# Patient Record
Sex: FEMALE | Race: WHITE | NOT HISPANIC OR LATINO | Employment: OTHER | URBAN - METROPOLITAN AREA
[De-identification: names, ages, dates, MRNs, and addresses within clinical notes are randomized per-mention and may not be internally consistent; named-entity substitution may affect disease eponyms.]

---

## 2018-06-26 ENCOUNTER — OFFICE VISIT (OUTPATIENT)
Dept: OBSTETRICS AND GYNECOLOGY | Age: 68
End: 2018-06-26

## 2018-06-26 ENCOUNTER — APPOINTMENT (OUTPATIENT)
Dept: WOMENS IMAGING | Facility: HOSPITAL | Age: 68
End: 2018-06-26

## 2018-06-26 VITALS
SYSTOLIC BLOOD PRESSURE: 112 MMHG | WEIGHT: 199 LBS | DIASTOLIC BLOOD PRESSURE: 62 MMHG | HEIGHT: 66 IN | BODY MASS INDEX: 31.98 KG/M2

## 2018-06-26 DIAGNOSIS — I10 ESSENTIAL HYPERTENSION: ICD-10-CM

## 2018-06-26 DIAGNOSIS — Z00.00 ANNUAL PHYSICAL EXAM: ICD-10-CM

## 2018-06-26 DIAGNOSIS — E03.9 HYPOTHYROIDISM, UNSPECIFIED TYPE: ICD-10-CM

## 2018-06-26 DIAGNOSIS — Z11.51 SCREENING FOR HPV (HUMAN PAPILLOMAVIRUS): ICD-10-CM

## 2018-06-26 DIAGNOSIS — Z12.4 SCREENING FOR CERVICAL CANCER: ICD-10-CM

## 2018-06-26 DIAGNOSIS — Z01.419 ENCOUNTER FOR WELL WOMAN EXAM WITH ROUTINE GYNECOLOGICAL EXAM: Primary | ICD-10-CM

## 2018-06-26 PROCEDURE — G0101 CA SCREEN;PELVIC/BREAST EXAM: HCPCS | Performed by: OBSTETRICS & GYNECOLOGY

## 2018-06-26 PROCEDURE — 77067 SCR MAMMO BI INCL CAD: CPT | Performed by: RADIOLOGY

## 2018-06-26 RX ORDER — ATENOLOL 25 MG/1
25 TABLET ORAL
COMMUNITY
Start: 2017-11-22

## 2018-06-26 RX ORDER — LEVOTHYROXINE SODIUM 137 UG/1
TABLET ORAL
COMMUNITY
Start: 2018-06-18

## 2018-06-26 RX ORDER — ESCITALOPRAM OXALATE 20 MG/1
20 TABLET ORAL
COMMUNITY

## 2018-06-26 RX ORDER — BUPROPION HYDROCHLORIDE 300 MG/1
300 TABLET ORAL
COMMUNITY

## 2018-06-26 RX ORDER — OLANZAPINE 2.5 MG/1
2.5 TABLET ORAL
COMMUNITY

## 2018-06-26 NOTE — PROGRESS NOTES
Subjective   Lis Ospina is a 68 y.o. female is being seen today for   Chief Complaint   Patient presents with   • Annual Exam     PT HERE FOR ROUTINE AE AND MG. SHE IS WELL. LAST PAP 2012 NEG. LAST DEXA 2016 LAST C-SCOPE 2016.   .    History of Present Illness  Patient is here for an annual exam.  A lot of things going on right now.  Her  was just diagnosed with a lymphoma month ago he is undergoing chemotherapy.  Her parents and 95 and 89 she just moved them down to a [facility] home here today or in [place].  She herself is doing well no particular complaints today.  Medicines are the same and a blood pressure thyroid again no change.  Her bowels bladder work well there is no other family history.  The following portions of the patient's history were reviewed and updated as appropriate: allergies, current medications, past family history, past medical history, past social history, past surgical history and problem list.    Vitals:    06/26/18 1009   BP: 112/62       PAST MEDICAL HISTORY  No past medical history on file.  OB History     No data available        No past surgical history on file.  No family history on file.  History   Smoking Status   • Not on file   Smokeless Tobacco   • Not on file       Current Outpatient Prescriptions:   •  atenolol (TENORMIN) 25 MG tablet, Take 25 mg by mouth., Disp: , Rfl:   •  levothyroxine (SYNTHROID, LEVOTHROID) 137 MCG tablet, TAKE 1 TABLET EVERY DAY, Disp: , Rfl:   •  buPROPion XL (WELLBUTRIN XL) 300 MG 24 hr tablet, Take 300 mg by mouth., Disp: , Rfl:   •  escitalopram (LEXAPRO) 20 MG tablet, Take 20 mg by mouth., Disp: , Rfl:   •  OLANZapine (zyPREXA) 2.5 MG tablet, Take 2.5 mg by mouth., Disp: , Rfl:     There is no immunization history on file for this patient.    Review of Systems   Psychiatric/Behavioral: Positive for sleep disturbance.       Objective   Physical Exam   Constitutional: She is oriented to person, place, and time. Vital signs are normal. She  appears well-developed and well-nourished.   Neck: No thyromegaly present.   Cardiovascular: Normal rate, regular rhythm and normal heart sounds.    Pulmonary/Chest: Effort normal. Right breast exhibits no inverted nipple, no mass, no nipple discharge, no skin change and no tenderness. Left breast exhibits no inverted nipple, no mass, no nipple discharge, no skin change and no tenderness. Breasts are symmetrical. There is no breast swelling.   Abdominal: Soft.   Genitourinary: Vagina normal and uterus normal. No breast tenderness, discharge or bleeding. Pelvic exam was performed with patient supine. No labial fusion. There is no rash, tenderness, lesion or injury on the right labia. There is no rash, tenderness, lesion or injury on the left labia. Cervix exhibits no motion tenderness, no discharge and no friability. Right adnexum displays no mass, no tenderness and no fullness. Left adnexum displays no mass, no tenderness and no fullness.   Neurological: She is alert and oriented to person, place, and time.   Psychiatric: She has a normal mood and affect.   Vitals reviewed.        Assessment/Plan   Lis was seen today for annual exam.    Diagnoses and all orders for this visit:    Encounter for well woman exam with routine gynecological exam  -     IGP, Aptima HPV, Rfx 16 / 18,45    Screening for HPV (human papillomavirus)  -     IGP, Aptima HPV, Rfx 16 / 18,45    Screening for cervical cancer  -     IGP, Aptima HPV, Rfx 16 / 18,45    Annual physical exam    Hypothyroidism, unspecified type    Essential hypertension        Today's exam was normal.  I did a Pap smear today.  Mammogram was done today.  Colonoscopy was in 16 skin for 10 years and a bone density was also done and 16 and she's good for another couple of years.  Talked about exercise come back in a year

## 2018-06-28 LAB
CYTOLOGIST CVX/VAG CYTO: NORMAL
CYTOLOGY CVX/VAG DOC THIN PREP: NORMAL
DX ICD CODE: NORMAL
HIV 1 & 2 AB SER-IMP: NORMAL
HPV I/H RISK 4 DNA CVX QL PROBE+SIG AMP: NEGATIVE
OTHER STN SPEC: NORMAL
PATH REPORT.FINAL DX SPEC: NORMAL
STAT OF ADQ CVX/VAG CYTO-IMP: NORMAL

## 2018-09-13 ENCOUNTER — TRANSCRIBE ORDERS (OUTPATIENT)
Dept: ADMINISTRATIVE | Facility: HOSPITAL | Age: 68
End: 2018-09-13

## 2018-09-13 ENCOUNTER — HOSPITAL ENCOUNTER (OUTPATIENT)
Dept: GENERAL RADIOLOGY | Facility: HOSPITAL | Age: 68
Discharge: HOME | End: 2018-09-13
Admitting: NURSE PRACTITIONER

## 2018-09-13 DIAGNOSIS — R05.9 COUGH: ICD-10-CM

## 2018-09-13 DIAGNOSIS — R79.89 OTHER SPECIFIED ABNORMAL FINDINGS OF BLOOD CHEMISTRY: ICD-10-CM

## 2018-09-13 DIAGNOSIS — N28.9 DISORDER OF KIDNEY AND URETER: Primary | ICD-10-CM

## 2018-09-13 DIAGNOSIS — N28.9 DISORDER OF KIDNEY AND URETER: ICD-10-CM

## 2018-09-13 DIAGNOSIS — R76.0 RAISED ANTIBODY TITER: ICD-10-CM

## 2018-09-13 PROCEDURE — 71046 X-RAY EXAM CHEST 2 VIEWS: CPT

## 2020-10-16 ENCOUNTER — APPOINTMENT (OUTPATIENT)
Dept: WOMENS IMAGING | Facility: HOSPITAL | Age: 70
End: 2020-10-16

## 2020-10-16 PROCEDURE — 77067 SCR MAMMO BI INCL CAD: CPT | Performed by: RADIOLOGY

## 2020-10-16 PROCEDURE — 77063 BREAST TOMOSYNTHESIS BI: CPT | Performed by: RADIOLOGY

## 2021-12-16 ENCOUNTER — APPOINTMENT (OUTPATIENT)
Dept: WOMENS IMAGING | Facility: HOSPITAL | Age: 71
End: 2021-12-16

## 2021-12-16 PROCEDURE — 77063 BREAST TOMOSYNTHESIS BI: CPT | Performed by: RADIOLOGY

## 2021-12-16 PROCEDURE — 77067 SCR MAMMO BI INCL CAD: CPT | Performed by: RADIOLOGY

## 2023-01-17 ENCOUNTER — APPOINTMENT (OUTPATIENT)
Dept: WOMENS IMAGING | Facility: HOSPITAL | Age: 73
End: 2023-01-17
Payer: MEDICARE

## 2023-01-17 PROCEDURE — 77065 DX MAMMO INCL CAD UNI: CPT | Performed by: RADIOLOGY

## 2023-01-17 PROCEDURE — G0279 TOMOSYNTHESIS, MAMMO: HCPCS | Performed by: RADIOLOGY

## 2023-01-17 PROCEDURE — 76642 ULTRASOUND BREAST LIMITED: CPT | Performed by: RADIOLOGY

## 2023-07-17 ENCOUNTER — APPOINTMENT (OUTPATIENT)
Dept: WOMENS IMAGING | Facility: HOSPITAL | Age: 73
End: 2023-07-17
Payer: MEDICARE

## 2023-07-17 PROCEDURE — 77065 DX MAMMO INCL CAD UNI: CPT | Performed by: RADIOLOGY

## 2023-07-17 PROCEDURE — G0279 TOMOSYNTHESIS, MAMMO: HCPCS | Performed by: RADIOLOGY

## 2023-07-17 PROCEDURE — 76642 ULTRASOUND BREAST LIMITED: CPT | Performed by: RADIOLOGY

## 2024-01-17 ENCOUNTER — APPOINTMENT (OUTPATIENT)
Dept: WOMENS IMAGING | Facility: HOSPITAL | Age: 74
End: 2024-01-17
Payer: MEDICARE

## 2024-01-17 PROCEDURE — G0279 TOMOSYNTHESIS, MAMMO: HCPCS | Performed by: RADIOLOGY

## 2024-01-17 PROCEDURE — 77066 DX MAMMO INCL CAD BI: CPT | Performed by: RADIOLOGY

## 2024-01-17 PROCEDURE — 76642 ULTRASOUND BREAST LIMITED: CPT | Performed by: RADIOLOGY

## 2024-01-19 ENCOUNTER — TELEPHONE (OUTPATIENT)
Dept: SURGERY | Facility: CLINIC | Age: 74
End: 2024-01-19
Payer: MEDICARE

## 2024-01-19 NOTE — TELEPHONE ENCOUNTER
New patient chart prepared for dr Pao Vaca to review, new patient referral for second opinion to breast cysts

## 2024-01-22 ENCOUNTER — TELEPHONE (OUTPATIENT)
Dept: SURGERY | Facility: CLINIC | Age: 74
End: 2024-01-22
Payer: MEDICARE

## 2024-01-22 NOTE — TELEPHONE ENCOUNTER
Spoke to pt and got her scheduled for a new pt appt with marcelino chandler for piedad breast cyst for a second opinion on 01/29 @10am    Pt stated understanding   Verified address  Pt aware to come 30 min early to fill out new pt paperwork

## 2024-01-22 NOTE — TELEPHONE ENCOUNTER
Lvm for pt to call back to [facility] new pt appt with marcelino chandler for piedad breast cyst pt wants second opinion

## 2024-01-26 NOTE — PROGRESS NOTES
[facility]     Referring Provider: Volodymyr Schmidt MD     Chief complaint:  piedad breast cyst     HPI: Ms. Lis Ospina is a 74 yo woman, seen at the request of Volodymyr Schmidt MD, for piedad breast cyst    I personally reviewed her records and summarized her relevant breast history/imagin2023 bilateral screening mammogram at [facility]  There are scattered areas of fibroglandular density.  There is an isodense focal asymmetry seen in the anterior one third upper outer region of the left breast  In the right breast no suspicious masses significant calcifications or other abnormalities are seen.  Impression  BI-RADS 0    2023 left diagnostic mammogram and left limited breast ultrasound at [facility]  Scattered fibroglandular density.  The previously described focal asymmetry seen in the anterior one third upper outer region of the left breast is less conspicuous on additional diagnostic images and has a mammographic appearance similar to the prior studies dating back to 2018.  Otherwise no suspicious masses suspicious microcalcifications or architectural distortions are identified.  Ultrasound demonstrates a mass measuring 4 mm in the left breast at 1:00 located 4 cm from the nipple.  This may represent a complicated cyst.  At 130, 2 cm from the nipple, there is a 7 mm complicated cyst.  1 of these 2 may correspond with the mammographic finding.  Impression  Mass in left breast probably benign.  Follow-up mammogram and ultrasound 6 months recommended.  BI-RADS 3    2023 left diagnostic mammogram and left limited breast ultrasound at [facility]  There are scattered areas of fibroglandular density.  Follow-up examination was performed for the focal asymmetry in the left breast 1:00 seen on 2023.  On the present examination there are 2 stable small focal asymmetries in the middle one third of the left breast at 1:00 upper outer located 4 cm from the nipple.  Ultrasound demonstrates 2 stable oval  clusters of cyst and complicated cyst with well-defined thin margins measuring 4 mm and 7 mm in the middle one third of the left breast at 1:00 upper outer located 4 cm from the nipple.  No internal vascularity identified by Doppler.  There is been no significant change from prior exam.  Impression  Stable clusters of cyst and complicated cyst in left breast are probably benign.  Follow-up ultrasound of the left breast in 6 months recommended.  6-month follow-up diagnostic mammogram is recommended.  This will also be time for the bilateral yearly examination.  BI-RADS 3    1/17/2024 bilateral diagnostic mammogram and left limited breast ultrasound at [facility]  Follow-up examination was performed for the focal asymmetries in the left breast 1:00 seen on 7/17/2023.  On the present examination there are 2 small focal asymmetries in the middle one third of the left breast at 1:00 upper outer located 4 cm from the nipple.  There are no significant change from prior exam.  In the right breast no suspicious masses significant calcifications or other abnormalities are seen.  Ultrasound demonstrates 2 stable oval hypoechoic avascular clusters of cyst and complicated cyst with well-defined thin margins measuring 4 mm and 7 mm in the middle one third of the left breast at 1:00 upper outer located 4 cm from nipple.  Impression  Cluster of cyst and complicated cyst in the left breast are probably benign.  A follow-up mammogram 1 year is recommended.  This will also be time for the patient's bilateral yearly mammogram.  A limited breast ultrasound in 1 year is recommended.  BI-RADS 3        She has a family history of breast cancer in her maternal first cousin dx age mid 30s.  She has a family history of ovarian cancer in maternal aunt dx age 70s.    Today she presents with concerns regarding abnormal mammograms and continually having to get mammograms.  She understands that the area of concern is a complicated cyst but wondering if  anything else should be done other than waiting for more imaging  She denies any breast lumps, pain, skin changes, or nipple discharge.         Review of Systems - Oncology    Medications:    Current Outpatient Medications:     amitriptyline (ELAVIL) 25 MG tablet, Take 1 tablet by mouth Daily., Disp: , Rfl:     aspirin 81 MG chewable tablet, Chew 1 tablet Daily., Disp: , Rfl:     atenolol (TENORMIN) 25 MG tablet, Take 1 tablet by mouth Daily., Disp: , Rfl:     atorvastatin (LIPITOR) 10 MG tablet, , Disp: , Rfl:     buPROPion XL (WELLBUTRIN XL) 300 MG 24 hr tablet, Take 1 tablet by mouth., Disp: , Rfl:     desvenlafaxine (PRISTIQ) 100 MG 24 hr tablet, Take 1 tablet by mouth Daily., Disp: , Rfl:     Glucosamine HCl 1000 MG tablet, Take  by mouth., Disp: , Rfl:     levothyroxine (SYNTHROID, LEVOTHROID) 137 MCG tablet, TAKE 1 TABLET EVERY DAY, Disp: , Rfl:     OLANZapine (zyPREXA) 2.5 MG tablet, Take 1 tablet by mouth., Disp: , Rfl:     Allergies:  Allergies   Allergen Reactions    Quetiapine Other (See Comments)     Joint aches    Aripiprazole Anxiety       Medical history:  No past medical history on file.    Surgical History:  Past Surgical History:   Procedure Laterality Date    APPENDECTOMY  1972    EYE SURGERY Bilateral 2001    cateract surgery    GALLBLADDER SURGERY  1996       Family History:  No family history on file.    Social History:   Social History     Socioeconomic History    Marital status:    Tobacco Use    Smoking status: Never     Passive exposure: Never    Smokeless tobacco: Never     Patient drinks 2 servings of caffeine per day.       GYNECOLOGIC HISTORY:   G: 3. P: 3. AB: 0.  Last menstrual period: 52  Age at menarche: 16  Age at first childbirth: 26  Lactation/How long: N/A  Age at menopause: 52  Total years of oral contraceptive use: 25 years  Total years of hormone replacement therapy: 4 years at [facility] 49      Physical Exam  Vitals:    01/29/24 0943   Pulse: 96   SpO2: 97%     ECOG 0 -  Asymptomatic  General: NAD, well appearing  Psych: a&o x 3, normal mood and affect  Eyes: EOMI, no scleral icterus  ENMT: neck supple without masses or thyromegaly, mucus membranes moist  Resp: normal effort, CTAB  CV: RRR, no murmurs, no edema   GI: soft, NT, ND  MSK: normal gait, normal ROM in bilateral shoulders  Lymph nodes: no cervical, supraclavicular or axillary lymphadenopathy  Breast: symmetric, medium  Right: No visible abnormalities on inspection while seated, with arms raised or hands on hips. No masses, skin changes, or nipple abnormalities.  Left: No visible abnormalities on inspection while seated, with arms raised or hands on hips. No masses, skin changes, or nipple abnormalities.      Assessment:    Abnormal breast imaging     Discussion:  I explained the concept of a BI-RADS 3 designation and the process of imaging surveillance. We discussed that a BI-RADS 3 designation describes an imaging finding that is 98-99% likely to represent a benign process. We discussed that the most common management of a BI-RADS 3 finding is initial 6 month imaging surveillance and that the entire period of imaging surveillance can often take 2 years.       Plan:  Exam in 6 months  Bilateral diagnostic mammogram and left limited ultrasound in January 2025  Monthly self breast exams  Return to the office if any new breast concerns      Vanesa Hernandez MA    I have spent 40 mins in face to face time with the patient and in chart review.    CC:  MD Hai Sewell Svarit, MD    EMR Dragon/transcription disclaimer:  Dictated using Dragon dictation

## 2024-01-29 ENCOUNTER — OFFICE VISIT (OUTPATIENT)
Dept: SURGERY | Facility: CLINIC | Age: 74
End: 2024-01-29
Payer: MEDICARE

## 2024-01-29 VITALS
BODY MASS INDEX: 31.34 KG/M2 | DIASTOLIC BLOOD PRESSURE: 74 MMHG | WEIGHT: 195 LBS | HEIGHT: 66 IN | HEART RATE: 96 BPM | SYSTOLIC BLOOD PRESSURE: 118 MMHG | OXYGEN SATURATION: 97 %

## 2024-01-29 DIAGNOSIS — R92.8 ABNORMAL FINDING ON BREAST IMAGING: Primary | ICD-10-CM

## 2024-01-29 PROCEDURE — 3078F DIAST BP <80 MM HG: CPT | Performed by: NURSE PRACTITIONER

## 2024-01-29 PROCEDURE — 1160F RVW MEDS BY RX/DR IN RCRD: CPT | Performed by: NURSE PRACTITIONER

## 2024-01-29 PROCEDURE — 3074F SYST BP LT 130 MM HG: CPT | Performed by: NURSE PRACTITIONER

## 2024-01-29 PROCEDURE — 1159F MED LIST DOCD IN RCRD: CPT | Performed by: NURSE PRACTITIONER

## 2024-01-29 PROCEDURE — 99204 OFFICE O/P NEW MOD 45 MIN: CPT | Performed by: NURSE PRACTITIONER

## 2024-01-29 RX ORDER — ATORVASTATIN CALCIUM 10 MG/1
TABLET, FILM COATED ORAL
COMMUNITY
Start: 2024-01-08

## 2024-01-29 RX ORDER — DESVENLAFAXINE 100 MG/1
100 TABLET, EXTENDED RELEASE ORAL DAILY
COMMUNITY

## 2024-01-29 RX ORDER — AMITRIPTYLINE HYDROCHLORIDE 25 MG/1
1 TABLET, FILM COATED ORAL DAILY
COMMUNITY

## 2024-01-29 RX ORDER — ASPIRIN 81 MG/1
1 TABLET, CHEWABLE ORAL DAILY
COMMUNITY

## 2024-01-29 RX ORDER — ATENOLOL 25 MG/1
25 TABLET ORAL DAILY
COMMUNITY

## 2024-07-26 NOTE — PROGRESS NOTES
[facility]     Referring Provider: Volodymyr Schmidt MD     Chief complaint:  piedad breast cyst     HPI: Ms. Lis Ospina is a 72 yo woman, seen at the request of Volodymyr Schmidt MD, for piedad breast cyst    I personally reviewed her records and summarized her relevant breast history/imagin2023 bilateral screening mammogram at [facility]  There are scattered areas of fibroglandular density.  There is an isodense focal asymmetry seen in the anterior one third upper outer region of the left breast  In the right breast no suspicious masses significant calcifications or other abnormalities are seen.  Impression  BI-RADS 0    2023 left diagnostic mammogram and left limited breast ultrasound at [facility]  Scattered fibroglandular density.  The previously described focal asymmetry seen in the anterior one third upper outer region of the left breast is less conspicuous on additional diagnostic images and has a mammographic appearance similar to the prior studies dating back to 2018.  Otherwise no suspicious masses suspicious microcalcifications or architectural distortions are identified.  Ultrasound demonstrates a mass measuring 4 mm in the left breast at 1:00 located 4 cm from the nipple.  This may represent a complicated cyst.  At 130, 2 cm from the nipple, there is a 7 mm complicated cyst.  1 of these 2 may correspond with the mammographic finding.  Impression  Mass in left breast probably benign.  Follow-up mammogram and ultrasound 6 months recommended.  BI-RADS 3    2023 left diagnostic mammogram and left limited breast ultrasound at [facility]  There are scattered areas of fibroglandular density.  Follow-up examination was performed for the focal asymmetry in the left breast 1:00 seen on 2023.  On the present examination there are 2 stable small focal asymmetries in the middle one third of the left breast at 1:00 upper outer located 4 cm from the nipple.  Ultrasound demonstrates 2 stable oval  clusters of cyst and complicated cyst with well-defined thin margins measuring 4 mm and 7 mm in the middle one third of the left breast at 1:00 upper outer located 4 cm from the nipple.  No internal vascularity identified by Doppler.  There is been no significant change from prior exam.  Impression  Stable clusters of cyst and complicated cyst in left breast are probably benign.  Follow-up ultrasound of the left breast in 6 months recommended.  6-month follow-up diagnostic mammogram is recommended.  This will also be time for the bilateral yearly examination.  BI-RADS 3    1/17/2024 bilateral diagnostic mammogram and left limited breast ultrasound at [facility]  Follow-up examination was performed for the focal asymmetries in the left breast 1:00 seen on 7/17/2023.  On the present examination there are 2 small focal asymmetries in the middle one third of the left breast at 1:00 upper outer located 4 cm from the nipple.  There are no significant change from prior exam.  In the right breast no suspicious masses significant calcifications or other abnormalities are seen.  Ultrasound demonstrates 2 stable oval hypoechoic avascular clusters of cyst and complicated cyst with well-defined thin margins measuring 4 mm and 7 mm in the middle one third of the left breast at 1:00 upper outer located 4 cm from nipple.  Impression  Cluster of cyst and complicated cyst in the left breast are probably benign.  A follow-up mammogram 1 year is recommended.  This will also be time for the patient's bilateral yearly mammogram.  A limited breast ultrasound in 1 year is recommended.  BI-RADS 3        She has a family history of breast cancer in her maternal first cousin dx age mid 30s.  She has a family history of ovarian cancer in maternal aunt dx age 70s.    Today she presents with concerns regarding abnormal mammograms and continually having to get mammograms.  She understands that the area of concern is a complicated cyst but wondering if  anything else should be done other than waiting for more imaging  She denies any breast lumps, pain, skin changes, or nipple discharge.    7/29/2024 interval history  Patient presenting to the office today for routine follow-up.  She has no new breast complaints or concerns today.  She is due for bilateral diagnostic mammogram and left limited ultrasound in January 2025.    Review of Systems - Oncology    Medications:    Current Outpatient Medications:     amitriptyline (ELAVIL) 25 MG tablet, Take 1 tablet by mouth Daily., Disp: , Rfl:     aspirin 81 MG chewable tablet, Chew 1 tablet Daily., Disp: , Rfl:     atenolol (TENORMIN) 25 MG tablet, Take 1 tablet by mouth Daily., Disp: , Rfl:     atorvastatin (LIPITOR) 10 MG tablet, , Disp: , Rfl:     buPROPion XL (WELLBUTRIN XL) 300 MG 24 hr tablet, Take 1 tablet by mouth., Disp: , Rfl:     desvenlafaxine (PRISTIQ) 100 MG 24 hr tablet, Take 1 tablet by mouth Daily., Disp: , Rfl:     Glucosamine HCl 1000 MG tablet, Take  by mouth., Disp: , Rfl:     levothyroxine (SYNTHROID, LEVOTHROID) 137 MCG tablet, TAKE 1 TABLET EVERY DAY, Disp: , Rfl:     OLANZapine (zyPREXA) 2.5 MG tablet, Take 1 tablet by mouth., Disp: , Rfl:     Allergies:  Allergies   Allergen Reactions    Quetiapine Other (See Comments)     Joint aches    Aripiprazole Anxiety       Medical history:  History reviewed. No pertinent past medical history.    Surgical History:  Past Surgical History:   Procedure Laterality Date    APPENDECTOMY  1972    EYE SURGERY Bilateral 2001    cateract surgery    GALLBLADDER SURGERY  1996       Family History:  Family History   Problem Relation Age of Onset    Breast cancer Maternal Aunt     Breast cancer Maternal Cousin        Social History:   Social History     Socioeconomic History    Marital status:    Tobacco Use    Smoking status: Never     Passive exposure: Never    Smokeless tobacco: Never   Vaping Use    Vaping status: Never Used   Substance and Sexual Activity     Drug use: Never    Sexual activity: Defer     Patient drinks 2 servings of caffeine per day.       GYNECOLOGIC HISTORY:   G: 3. P: 3. AB: 0.  Last menstrual period: 52  Age at menarche: 16  Age at first childbirth: 26  Lactation/How long: N/A  Age at menopause: 52  Total years of oral contraceptive use: 25 years  Total years of hormone replacement therapy: 4 years at [facility] 49      Physical Exam  Vitals:    07/29/24 0858   BP: 132/78   Pulse: 86   Resp: 17   SpO2: 98%       ECOG 0 - Asymptomatic  General: NAD, well appearing  Psych: a&o x 3, normal mood and affect  Eyes: EOMI, no scleral icterus  ENMT: neck supple without masses or thyromegaly, mucus membranes moist  Resp: normal effort, CTAB  CV: RRR, no murmurs, no edema   GI: soft, NT, ND  MSK: normal gait, normal ROM in bilateral shoulders  Lymph nodes: no cervical, supraclavicular or axillary lymphadenopathy  Breast: symmetric, medium  Right: No visible abnormalities on inspection while seated, with arms raised or hands on hips. No masses, skin changes, or nipple abnormalities.  Left: No visible abnormalities on inspection while seated, with arms raised or hands on hips. No masses, skin changes, or nipple abnormalities.      Assessment:    Abnormal breast imaging     Discussion:  I explained the concept of a BI-RADS 3 designation and the process of imaging surveillance. We discussed that a BI-RADS 3 designation describes an imaging finding that is 98-99% likely to represent a benign process. We discussed that the most common management of a BI-RADS 3 finding is initial 6 month imaging surveillance and that the entire period of imaging surveillance can often take 2 years.       Plan:    Bilateral diagnostic mammogram and left limited ultrasound in January 2025  Monthly self breast exams  Return to the office if any new breast concerns      GUILLERMO Romero    I have spent 20 mins in face to face time with the patient and in chart review.    CC:  Volodymyr PÉREZ  MD Hai Schmidt Svarit, MD EMR Dragon/transcription disclaimer:  Dictated using Dragon dictation

## 2024-07-29 ENCOUNTER — OFFICE VISIT (OUTPATIENT)
Dept: SURGERY | Facility: CLINIC | Age: 74
End: 2024-07-29
Payer: MEDICARE

## 2024-07-29 VITALS
RESPIRATION RATE: 17 BRPM | HEIGHT: 66 IN | OXYGEN SATURATION: 98 % | BODY MASS INDEX: 32.14 KG/M2 | WEIGHT: 200 LBS | DIASTOLIC BLOOD PRESSURE: 78 MMHG | HEART RATE: 86 BPM | SYSTOLIC BLOOD PRESSURE: 132 MMHG

## 2024-07-29 DIAGNOSIS — R92.8 ABNORMAL FINDING ON BREAST IMAGING: Primary | ICD-10-CM

## 2024-07-29 PROCEDURE — 1159F MED LIST DOCD IN RCRD: CPT | Performed by: NURSE PRACTITIONER

## 2024-07-29 PROCEDURE — 3078F DIAST BP <80 MM HG: CPT | Performed by: NURSE PRACTITIONER

## 2024-07-29 PROCEDURE — 1160F RVW MEDS BY RX/DR IN RCRD: CPT | Performed by: NURSE PRACTITIONER

## 2024-07-29 PROCEDURE — 3075F SYST BP GE 130 - 139MM HG: CPT | Performed by: NURSE PRACTITIONER

## 2024-07-29 PROCEDURE — 99213 OFFICE O/P EST LOW 20 MIN: CPT | Performed by: NURSE PRACTITIONER

## 2025-01-20 ENCOUNTER — APPOINTMENT (OUTPATIENT)
Dept: WOMENS IMAGING | Facility: HOSPITAL | Age: 75
End: 2025-01-20
Payer: MEDICARE

## 2025-01-20 PROCEDURE — G0279 TOMOSYNTHESIS, MAMMO: HCPCS | Performed by: RADIOLOGY

## 2025-01-20 PROCEDURE — 77066 DX MAMMO INCL CAD BI: CPT | Performed by: RADIOLOGY

## 2025-01-20 PROCEDURE — 76642 ULTRASOUND BREAST LIMITED: CPT | Performed by: RADIOLOGY

## 2025-01-24 NOTE — PROGRESS NOTES
[facility]     Referring Provider: Volodymyr Schmidt MD     Chief complaint:  piedad breast cyst     HPI: Ms. Lis Ospina is a 74 yo woman, seen at the request of Volodymyr Schmidt MD, for piedad breast cyst    I personally reviewed her records and summarized her relevant breast history/imagin2023 bilateral screening mammogram at [facility]  There are scattered areas of fibroglandular density.  There is an isodense focal asymmetry seen in the anterior one third upper outer region of the left breast  In the right breast no suspicious masses significant calcifications or other abnormalities are seen.  Impression  BI-RADS 0    2023 left diagnostic mammogram and left limited breast ultrasound at [facility]  Scattered fibroglandular density.  The previously described focal asymmetry seen in the anterior one third upper outer region of the left breast is less conspicuous on additional diagnostic images and has a mammographic appearance similar to the prior studies dating back to 2018.  Otherwise no suspicious masses suspicious microcalcifications or architectural distortions are identified.  Ultrasound demonstrates a mass measuring 4 mm in the left breast at 1:00 located 4 cm from the nipple.  This may represent a complicated cyst.  At 130, 2 cm from the nipple, there is a 7 mm complicated cyst.  1 of these 2 may correspond with the mammographic finding.  Impression  Mass in left breast probably benign.  Follow-up mammogram and ultrasound 6 months recommended.  BI-RADS 3    2023 left diagnostic mammogram and left limited breast ultrasound at [facility]  There are scattered areas of fibroglandular density.  Follow-up examination was performed for the focal asymmetry in the left breast 1:00 seen on 2023.  On the present examination there are 2 stable small focal asymmetries in the middle one third of the left breast at 1:00 upper outer located 4 cm from the nipple.  Ultrasound demonstrates 2 stable oval  clusters of cyst and complicated cyst with well-defined thin margins measuring 4 mm and 7 mm in the middle one third of the left breast at 1:00 upper outer located 4 cm from the nipple.  No internal vascularity identified by Doppler.  There is been no significant change from prior exam.  Impression  Stable clusters of cyst and complicated cyst in left breast are probably benign.  Follow-up ultrasound of the left breast in 6 months recommended.  6-month follow-up diagnostic mammogram is recommended.  This will also be time for the bilateral yearly examination.  BI-RADS 3    1/17/2024 bilateral diagnostic mammogram and left limited breast ultrasound at [facility]  Follow-up examination was performed for the focal asymmetries in the left breast 1:00 seen on 7/17/2023.  On the present examination there are 2 small focal asymmetries in the middle one third of the left breast at 1:00 upper outer located 4 cm from the nipple.  There are no significant change from prior exam.  In the right breast no suspicious masses significant calcifications or other abnormalities are seen.  Ultrasound demonstrates 2 stable oval hypoechoic avascular clusters of cyst and complicated cyst with well-defined thin margins measuring 4 mm and 7 mm in the middle one third of the left breast at 1:00 upper outer located 4 cm from nipple.  Impression  Cluster of cyst and complicated cyst in the left breast are probably benign.  A follow-up mammogram 1 year is recommended.  This will also be time for the patient's bilateral yearly mammogram.  A limited breast ultrasound in 1 year is recommended.  BI-RADS 3        She has a family history of breast cancer in her maternal first cousin dx age mid 30s.  She has a family history of ovarian cancer in maternal aunt dx age 70s.    Today she presents with concerns regarding abnormal mammograms and continually having to get mammograms.  She understands that the area of concern is a complicated cyst but wondering if  anything else should be done other than waiting for more imaging  She denies any breast lumps, pain, skin changes, or nipple discharge.    7/29/2024   Patient presenting to the office today for routine follow-up.  She has no new breast complaints or concerns today.  She is due for bilateral diagnostic mammogram and left limited ultrasound in January 2025.    1/27/2025 interval history  Patient presenting to the office today for routine follow-up.  On 1/20/2025 she had a bilateral diagnostic mammogram and left limited breast ultrasound that returned as BI-RADS 2.  Calling the cluster of cyst and complicated cyst benign.  She has no new breast complaints or concerns today.    Review of Systems - Oncology    Medications:    Current Outpatient Medications:     amitriptyline (ELAVIL) 25 MG tablet, Take 1 tablet by mouth Daily., Disp: , Rfl:     aspirin 81 MG chewable tablet, Chew 1 tablet Daily., Disp: , Rfl:     atenolol (TENORMIN) 25 MG tablet, Take 1 tablet by mouth Daily., Disp: , Rfl:     atorvastatin (LIPITOR) 10 MG tablet, , Disp: , Rfl:     buPROPion XL (WELLBUTRIN XL) 300 MG 24 hr tablet, Take 1 tablet by mouth., Disp: , Rfl:     desvenlafaxine (PRISTIQ) 100 MG 24 hr tablet, Take 1 tablet by mouth Daily., Disp: , Rfl:     Glucosamine HCl 1000 MG tablet, Take  by mouth., Disp: , Rfl:     levothyroxine (SYNTHROID, LEVOTHROID) 137 MCG tablet, TAKE 1 TABLET EVERY DAY, Disp: , Rfl:     OLANZapine (zyPREXA) 2.5 MG tablet, Take 1 tablet by mouth., Disp: , Rfl:     Allergies:  Allergies   Allergen Reactions    Quetiapine Other (See Comments)     Joint aches    Aripiprazole Anxiety       Medical history:  No past medical history on file.    Surgical History:  Past Surgical History:   Procedure Laterality Date    APPENDECTOMY  1972    EYE SURGERY Bilateral 2001    cateract surgery    GALLBLADDER SURGERY  1996       Family History:  Family History   Problem Relation Age of Onset    Breast cancer Maternal Aunt     Breast  cancer Maternal Cousin        Social History:   Social History     Socioeconomic History    Marital status:    Tobacco Use    Smoking status: Never     Passive exposure: Never    Smokeless tobacco: Never   Vaping Use    Vaping status: Never Used   Substance and Sexual Activity    Drug use: Never    Sexual activity: Defer     Patient drinks 2 servings of caffeine per day.       GYNECOLOGIC HISTORY:   G: 3. P: 3. AB: 0.  Last menstrual period: 52  Age at menarche: 16  Age at first childbirth: 26  Lactation/How long: N/A  Age at menopause: 52  Total years of oral contraceptive use: 25 years  Total years of hormone replacement therapy: 4 years at [facility] 49      Physical Exam  There were no vitals filed for this visit.      ECOG 0 - Asymptomatic  General: NAD, well appearing  Psych: a&o x 3, normal mood and affect  Eyes: EOMI, no scleral icterus  ENMT: neck supple without masses or thyromegaly, mucus membranes moist  Resp: normal effort, CTAB  CV: RRR, no murmurs, no edema   GI: soft, NT, ND  MSK: normal gait, normal ROM in bilateral shoulders  Lymph nodes: no cervical, supraclavicular or axillary lymphadenopathy  Breast: symmetric, medium  Right: No visible abnormalities on inspection while seated, with arms raised or hands on hips. No masses, skin changes, or nipple abnormalities.  Left: No visible abnormalities on inspection while seated, with arms raised or hands on hips. No masses, skin changes, or nipple abnormalities.    Imagin2025 bilateral diagnostic mammogram and left limited breast ultrasound at [facility]  There are scattered areas of fibroglandular density.  Follow-up examination was performed for the focal asymmetries in the left breast, 1 o'clock seen on 2024. On  the present examination, there are two small focal asymmetries in the middle one-third of the left breast at 1  o'clock, upper outer located 4 centimeters from the nipple. There are no significant changes from the prior exam(s).  In  the right breast, no suspicious masses, significant calcifications or other abnormalities are seen.  LEFT BREAST REALTIME LIMITED BREAST ULTRASOUND  High resolution real-time ultrasound scanning was performed by the ultrasound technologist. Still images were  obtained by the ultrasound technologist and submitted for radiologist review.  Ultrasound demonstrates two stable oval hypoechoic avascular clusters of cysts and complicated cysts with well  defined, thin margins measuring 4 mm and 7 mm in the middle one-third of the left breast at 1 o'clock, upper outer  located 4 centimeters from the nipple.  IMPRESSION:  Clusters of cysts and complicated cysts in the left breast are benign-negative.  A return to screening in 1 year is recommended.  The patient was mailed a notification letter.  BI-RADS Category 2: Benign    Assessment:    Abnormal breast imaging - resolved 1/2025    Discussion:  I explained the concept of a BI-RADS 3 designation and the process of imaging surveillance. We discussed that a BI-RADS 3 designation describes an imaging finding that is 98-99% likely to represent a benign process. We discussed that the most common management of a BI-RADS 3 finding is initial 6 month imaging surveillance and that the entire period of imaging surveillance can often take 2 years.       Plan:    Considering she has no new breast issues and has normal imaging I will not be giving her a follow-up in the office.  If she has any issues in the future I would be happy to see her back.  She is due for her screening mammogram in January 2026 which can be ordered by her OB/GYN or her PCP      GUILLERMO Romero    I have spent 20 mins in face to face time with the patient and in chart review.    CC:  MD Hai Sewell Svarit, MD EMR Dragon/transcription disclaimer:  Dictated using Dragon dictation

## 2025-01-27 ENCOUNTER — OFFICE VISIT (OUTPATIENT)
Dept: SURGERY | Facility: CLINIC | Age: 75
End: 2025-01-27
Payer: MEDICARE

## 2025-01-27 VITALS
DIASTOLIC BLOOD PRESSURE: 82 MMHG | SYSTOLIC BLOOD PRESSURE: 126 MMHG | OXYGEN SATURATION: 96 % | WEIGHT: 201 LBS | HEIGHT: 66 IN | HEART RATE: 91 BPM | BODY MASS INDEX: 32.3 KG/M2

## 2025-01-27 DIAGNOSIS — R92.8 ABNORMAL FINDING ON BREAST IMAGING: Primary | ICD-10-CM

## 2025-01-27 PROCEDURE — 1160F RVW MEDS BY RX/DR IN RCRD: CPT | Performed by: NURSE PRACTITIONER

## 2025-01-27 PROCEDURE — 3074F SYST BP LT 130 MM HG: CPT | Performed by: NURSE PRACTITIONER

## 2025-01-27 PROCEDURE — 99213 OFFICE O/P EST LOW 20 MIN: CPT | Performed by: NURSE PRACTITIONER

## 2025-01-27 PROCEDURE — 1159F MED LIST DOCD IN RCRD: CPT | Performed by: NURSE PRACTITIONER

## 2025-01-27 PROCEDURE — 3079F DIAST BP 80-89 MM HG: CPT | Performed by: NURSE PRACTITIONER
